# Patient Record
Sex: FEMALE | Race: BLACK OR AFRICAN AMERICAN | NOT HISPANIC OR LATINO | URBAN - METROPOLITAN AREA
[De-identification: names, ages, dates, MRNs, and addresses within clinical notes are randomized per-mention and may not be internally consistent; named-entity substitution may affect disease eponyms.]

---

## 2022-12-09 ENCOUNTER — OFFICE VISIT (OUTPATIENT)
Dept: URBAN - METROPOLITAN AREA CLINIC 92 | Facility: CLINIC | Age: 42
End: 2022-12-09

## 2023-02-10 ENCOUNTER — OFFICE VISIT (OUTPATIENT)
Dept: URBAN - METROPOLITAN AREA CLINIC 17 | Facility: CLINIC | Age: 43
End: 2023-02-10

## 2023-02-23 ENCOUNTER — TELEPHONE ENCOUNTER (OUTPATIENT)
Dept: URBAN - METROPOLITAN AREA CLINIC 92 | Facility: CLINIC | Age: 43
End: 2023-02-23

## 2023-02-24 ENCOUNTER — OFFICE VISIT (OUTPATIENT)
Dept: URBAN - METROPOLITAN AREA CLINIC 92 | Facility: CLINIC | Age: 43
End: 2023-02-24

## 2023-02-24 RX ORDER — FOLIC ACID 1 MG/1
TAKE 1 TABLET (1 MG) BY ORAL ROUTE ONCE DAILY TABLET ORAL 1
Qty: 0 | Refills: 0 | COMMUNITY
Start: 1900-01-01

## 2023-02-24 RX ORDER — SUMATRIPTAN SUCCINATE 25 MG/1
TABLET, FILM COATED ORAL
Qty: 0 | Refills: 0 | COMMUNITY
Start: 1900-01-01

## 2023-02-24 RX ORDER — LEVETIRACETAM 1000 MG/1
TAKE 1 TABLET (1,000 MG) BY ORAL ROUTE EVERY 12 HOURS TABLET, FILM COATED ORAL 2
Qty: 0 | Refills: 0 | COMMUNITY
Start: 1900-01-01

## 2023-02-24 RX ORDER — ATENOLOL 25 MG/1
TAKE 1 TABLET (25 MG) BY ORAL ROUTE ONCE DAILY TABLET ORAL 1
Qty: 0 | Refills: 0 | COMMUNITY
Start: 1900-01-01

## 2023-02-24 RX ORDER — ASPIRIN 81 MG/1
CHEW 1 TABLET (81 MG) BY ORAL ROUTE ONCE DAILY TABLET, CHEWABLE ORAL 1
Qty: 0 | Refills: 0 | COMMUNITY
Start: 1900-01-01

## 2023-02-24 RX ORDER — IBUPROFEN 800 MG/1
TAKE 1 TABLET (800 MG) BY ORAL ROUTE 3 TIMES PER DAY WITH FOOD TABLET ORAL
Qty: 0 | Refills: 0 | COMMUNITY
Start: 1900-01-01

## 2023-02-24 NOTE — HPI-TODAY'S VISIT:
42-year-old female presents today for a hospital follow-up. She was previously seen by  in 2016.  It is noted patient had developed acute pancreatitis requiring hospitalization at [facility] on 4-2016.  The symptoms abated with conservative management.  During her stay she had a CT A/P that showed a pancreatic mass which was further evaluated by an MRI that demonstrated a 2.5 cm x 1.9 cm hypointense lesion with progressive arterial and delayed enhancement concerning for malignancy.  Cholelithiasis was also seen.  Due to this finding a EUS was done with fine-needle aspiration that demonstrated neuroendocrine tumor morphologically grade 1 due to this patient was sent to [place] all and was then lost to follow-up.  Patient did have a pancreectomy with splenectomy in July 2016 and has been following with Dr. Alexander she does have an upcoming appointment with him 2- per [facility] records patient has not been seen by them since 2019?  Her last MRI was in 2019 per [facility] records Patient had a CT at [facility] on  which demonstrated an irregular soft tissue attenuating mass within the gastric fundus which is new compared to 2016 this is concerning for malignancy recommend upper endoscopy, new indeterminate right adrenal nodule measuring up to 2.6 cm consider biopsy or further evaluation with PET CT, cholelithiasis without acute cholecystitis, bilateral subpleural nodules measuring up to 4 mm attention to follow-up

## 2023-03-29 ENCOUNTER — TELEPHONE ENCOUNTER (OUTPATIENT)
Dept: URBAN - METROPOLITAN AREA CLINIC 6 | Facility: CLINIC | Age: 43
End: 2023-03-29

## 2023-05-01 ENCOUNTER — DASHBOARD ENCOUNTERS (OUTPATIENT)
Age: 43
End: 2023-05-01

## 2023-05-01 ENCOUNTER — OFFICE VISIT (OUTPATIENT)
Dept: URBAN - METROPOLITAN AREA CLINIC 92 | Facility: CLINIC | Age: 43
End: 2023-05-01
Payer: COMMERCIAL

## 2023-05-01 ENCOUNTER — WEB ENCOUNTER (OUTPATIENT)
Dept: URBAN - METROPOLITAN AREA CLINIC 92 | Facility: CLINIC | Age: 43
End: 2023-05-01

## 2023-05-01 VITALS
HEART RATE: 87 BPM | TEMPERATURE: 97.6 F | SYSTOLIC BLOOD PRESSURE: 140 MMHG | BODY MASS INDEX: 40.43 KG/M2 | HEIGHT: 69 IN | WEIGHT: 273 LBS | DIASTOLIC BLOOD PRESSURE: 93 MMHG

## 2023-05-01 DIAGNOSIS — K21.9 GASTROESOPHAGEAL REFLUX DISEASE WITHOUT ESOPHAGITIS: ICD-10-CM

## 2023-05-01 DIAGNOSIS — R11.0 NAUSEA: ICD-10-CM

## 2023-05-01 DIAGNOSIS — C7A.8 NEUROENDOCRINE CANCER: ICD-10-CM

## 2023-05-01 DIAGNOSIS — R10.13 EPIGASTRIC ABDOMINAL PAIN: ICD-10-CM

## 2023-05-01 PROBLEM — 266435005: Status: ACTIVE | Noted: 2023-05-01

## 2023-05-01 PROCEDURE — 99204 OFFICE O/P NEW MOD 45 MIN: CPT

## 2023-05-01 RX ORDER — ONDANSETRON HYDROCHLORIDE 4 MG/1
1 TABLET TABLET, FILM COATED ORAL
Qty: 30 | Refills: 0 | OUTPATIENT
Start: 2023-05-01

## 2023-05-01 RX ORDER — LEVETIRACETAM 1000 MG/1
TAKE 1 TABLET (1,000 MG) BY ORAL ROUTE EVERY 12 HOURS TABLET, FILM COATED ORAL 2
Qty: 0 | Refills: 0 | Status: ACTIVE | COMMUNITY
Start: 1900-01-01

## 2023-05-01 RX ORDER — SUMATRIPTAN SUCCINATE 25 MG/1
TABLET, FILM COATED ORAL
Qty: 0 | Refills: 0 | Status: ACTIVE | COMMUNITY
Start: 1900-01-01

## 2023-05-01 RX ORDER — ASPIRIN 81 MG/1
CHEW 1 TABLET (81 MG) BY ORAL ROUTE ONCE DAILY TABLET, CHEWABLE ORAL 1
Qty: 0 | Refills: 0 | Status: ACTIVE | COMMUNITY
Start: 1900-01-01

## 2023-05-01 NOTE — HPI-TODAY'S VISIT:
42-year-old female presents today for a  egd consult. She was referred to us by Dr. Phyllis Alexander, a copy of this note will be sent to referring provider.   She was previously seen by  in 2016.  It is noted patient had developed acute pancreatitis requiring hospitalization at [facility] on 4-2016.  During her stay she had a CT A/P that showed a pancreatic mass which was further evaluated by an MRI that demonstrated a 2.5 cm x 1.9 cm hypointense lesion with progressive arterial and delayed enhancement concerning for malignancy.  Cholelithiasis was also seen.  Due to this finding a EUS was done with fine-needle aspiration that demonstrated neuroendocrine tumor morphologically grade 1. She was sent to Dr. Alexander who performed a pancreatectomy and splenectomy on 7/2016. She was lost to f/u until recently when she saw Dr. Alexander for a ER visit f/u. She presented to [facility] ED 10/2022 with complaints of severe epigastric pain associated with NV. There she had a CT which demonstrated an irregular soft tissue attenuating mass within the gastric fundus which is new compared to 2016 this is concerning for malignancy recommend upper endoscopy, new indeterminate right adrenal nodule measuring up to 2.6 cm consider biopsy or further evaluation with PET CT, cholelithiasis without acute cholecystitis, bilateral subpleural nodules measuring up to 4 mm attention to follow-up. She saw Dr. Alexander in 2/2023 and she obtained a MRI on 2/27/23 which demonstrated Status post splenectomy and distal pancreatectomy. No metastatic disease in the abdomen. She continued to have epigastric pain so she was sent here for an evaluation. If neg egd Dr. Alexander is planning a cholecystectomy.   Currently, patient states that her epigastric pain comes and goes. It is worse with certain foods, but she has not able to tell what types of food. She will get episodes of abd pain, n/v and diaphoresis. Pain does radiate to the back. Most recent episode was yesterday. She was placed on Pantoprazole 40mg which she has been taking for a month with minimal relief of abd pain. This has however helped her GERD sx. She has no dysphagia, odynophagia. She does note she uses [employer] powders intermittently. Has not had any in 2 weeks.   Bm are normal no hematochezia, melena or unexpected weight loss. She has no fam h/o gi malignancies

## 2023-05-05 ENCOUNTER — OFFICE VISIT (OUTPATIENT)
Dept: URBAN - METROPOLITAN AREA SURGERY CENTER 16 | Facility: SURGERY CENTER | Age: 43
End: 2023-05-05
Payer: COMMERCIAL

## 2023-05-05 ENCOUNTER — CLAIMS CREATED FROM THE CLAIM WINDOW (OUTPATIENT)
Dept: URBAN - METROPOLITAN AREA CLINIC 4 | Facility: CLINIC | Age: 43
End: 2023-05-05
Payer: COMMERCIAL

## 2023-05-05 DIAGNOSIS — K29.70 GASTRITIS, UNSPECIFIED, WITHOUT BLEEDING: ICD-10-CM

## 2023-05-05 DIAGNOSIS — R10.13 ABDOMINAL DISCOMFORT, EPIGASTRIC: ICD-10-CM

## 2023-05-05 DIAGNOSIS — K31.89 ACQUIRED DEFORMITY OF DUODENUM: ICD-10-CM

## 2023-05-05 PROCEDURE — 43239 EGD BIOPSY SINGLE/MULTIPLE: CPT | Performed by: INTERNAL MEDICINE

## 2023-05-05 PROCEDURE — 88305 TISSUE EXAM BY PATHOLOGIST: CPT | Performed by: PATHOLOGY

## 2023-05-05 PROCEDURE — 88342 IMHCHEM/IMCYTCHM 1ST ANTB: CPT | Performed by: PATHOLOGY

## 2023-05-05 PROCEDURE — G8907 PT DOC NO EVENTS ON DISCHARG: HCPCS | Performed by: INTERNAL MEDICINE

## 2023-05-05 RX ORDER — ASPIRIN 81 MG/1
CHEW 1 TABLET (81 MG) BY ORAL ROUTE ONCE DAILY TABLET, CHEWABLE ORAL 1
Qty: 0 | Refills: 0 | Status: ACTIVE | COMMUNITY
Start: 1900-01-01

## 2023-05-05 RX ORDER — ONDANSETRON HYDROCHLORIDE 4 MG/1
1 TABLET TABLET, FILM COATED ORAL
Qty: 30 | Refills: 0 | Status: ACTIVE | COMMUNITY
Start: 2023-05-01

## 2023-05-05 RX ORDER — LEVETIRACETAM 1000 MG/1
TAKE 1 TABLET (1,000 MG) BY ORAL ROUTE EVERY 12 HOURS TABLET, FILM COATED ORAL 2
Qty: 0 | Refills: 0 | Status: ACTIVE | COMMUNITY
Start: 1900-01-01

## 2023-05-05 RX ORDER — SUMATRIPTAN SUCCINATE 25 MG/1
TABLET, FILM COATED ORAL
Qty: 0 | Refills: 0 | Status: ACTIVE | COMMUNITY
Start: 1900-01-01

## 2023-07-18 ENCOUNTER — ERX REFILL RESPONSE (OUTPATIENT)
Dept: URBAN - METROPOLITAN AREA CLINIC 92 | Facility: CLINIC | Age: 43
End: 2023-07-18

## 2023-07-18 RX ORDER — ONDANSETRON HYDROCHLORIDE 4 MG/1
TAKE ONE TABLET BY MOUTH EVERY 4 TO 6 HOURS AS NEEDED TABLET, FILM COATED ORAL
Qty: 30 TABLET | Refills: 0 | OUTPATIENT

## 2023-07-18 RX ORDER — ONDANSETRON HYDROCHLORIDE 4 MG/1
1 TABLET TABLET, FILM COATED ORAL
Qty: 30 | Refills: 0 | OUTPATIENT

## 2023-09-07 ENCOUNTER — ERX REFILL RESPONSE (OUTPATIENT)
Dept: URBAN - METROPOLITAN AREA CLINIC 92 | Facility: CLINIC | Age: 43
End: 2023-09-07

## 2023-09-07 RX ORDER — ONDANSETRON HYDROCHLORIDE 4 MG/1
TAKE ONE TABLET BY MOUTH EVERY 4 TO 6 HOURS AS NEEDED TABLET, FILM COATED ORAL
Qty: 30 TABLET | Refills: 0 | OUTPATIENT

## 2023-09-07 RX ORDER — ONDANSETRON HYDROCHLORIDE 4 MG/1
TAKE ONE TABLET BY MOUTH EVERY 4 TO 6 HOURS TABLET, FILM COATED ORAL
Qty: 30 TABLET | Refills: 0 | OUTPATIENT

## 2023-12-28 NOTE — PHYSICAL EXAM CHEST:
normal breath sounds. 1/2 tsp/moderately thick Further PO trials withheld 2/2 high risk of aspiration

## 2024-03-08 ENCOUNTER — OV EP (OUTPATIENT)
Dept: URBAN - METROPOLITAN AREA CLINIC 92 | Facility: CLINIC | Age: 44
End: 2024-03-08